# Patient Record
Sex: FEMALE | NOT HISPANIC OR LATINO | Employment: FULL TIME | URBAN - METROPOLITAN AREA
[De-identification: names, ages, dates, MRNs, and addresses within clinical notes are randomized per-mention and may not be internally consistent; named-entity substitution may affect disease eponyms.]

---

## 2024-02-26 ENCOUNTER — OFFICE VISIT (OUTPATIENT)
Dept: PRIMARY CARE | Facility: CLINIC | Age: 54
End: 2024-02-26
Payer: COMMERCIAL

## 2024-02-26 VITALS
OXYGEN SATURATION: 99 % | BODY MASS INDEX: 30.49 KG/M2 | WEIGHT: 183 LBS | TEMPERATURE: 98.9 F | SYSTOLIC BLOOD PRESSURE: 110 MMHG | HEART RATE: 75 BPM | HEIGHT: 65 IN | DIASTOLIC BLOOD PRESSURE: 78 MMHG | RESPIRATION RATE: 20 BRPM

## 2024-02-26 DIAGNOSIS — Z12.31 ENCOUNTER FOR SCREENING MAMMOGRAM FOR MALIGNANT NEOPLASM OF BREAST: ICD-10-CM

## 2024-02-26 DIAGNOSIS — Z12.11 SCREENING FOR COLON CANCER: ICD-10-CM

## 2024-02-26 DIAGNOSIS — Z00.00 ROUTINE ADULT HEALTH MAINTENANCE: Primary | ICD-10-CM

## 2024-02-26 DIAGNOSIS — E55.9 VITAMIN D DEFICIENCY: ICD-10-CM

## 2024-02-26 DIAGNOSIS — E78.2 MIXED HYPERLIPIDEMIA: ICD-10-CM

## 2024-02-26 PROCEDURE — 84443 ASSAY THYROID STIM HORMONE: CPT | Performed by: NURSE PRACTITIONER

## 2024-02-26 PROCEDURE — 85025 COMPLETE CBC W/AUTO DIFF WBC: CPT | Performed by: NURSE PRACTITIONER

## 2024-02-26 PROCEDURE — 80061 LIPID PANEL: CPT | Performed by: NURSE PRACTITIONER

## 2024-02-26 PROCEDURE — 99396 PREV VISIT EST AGE 40-64: CPT | Performed by: NURSE PRACTITIONER

## 2024-02-26 PROCEDURE — 80053 COMPREHEN METABOLIC PANEL: CPT | Performed by: NURSE PRACTITIONER

## 2024-02-26 PROCEDURE — 36415 COLL VENOUS BLD VENIPUNCTURE: CPT | Performed by: NURSE PRACTITIONER

## 2024-02-26 PROCEDURE — 1036F TOBACCO NON-USER: CPT | Performed by: NURSE PRACTITIONER

## 2024-02-26 PROCEDURE — 82306 VITAMIN D 25 HYDROXY: CPT | Performed by: NURSE PRACTITIONER

## 2024-02-26 ASSESSMENT — PAIN SCALES - GENERAL: PAINLEVEL: 0-NO PAIN

## 2024-02-26 ASSESSMENT — PATIENT HEALTH QUESTIONNAIRE - PHQ9
1. LITTLE INTEREST OR PLEASURE IN DOING THINGS: NOT AT ALL
2. FEELING DOWN, DEPRESSED OR HOPELESS: NOT AT ALL
SUM OF ALL RESPONSES TO PHQ9 QUESTIONS 1 AND 2: 0

## 2024-02-26 ASSESSMENT — ENCOUNTER SYMPTOMS
MUSCULOSKELETAL NEGATIVE: 1
CARDIOVASCULAR NEGATIVE: 1
NEUROLOGICAL NEGATIVE: 1
RESPIRATORY NEGATIVE: 1
GASTROINTESTINAL NEGATIVE: 1
CONSTITUTIONAL NEGATIVE: 1

## 2024-02-26 NOTE — PROGRESS NOTES
"Subjective   Patient ID: Shannan Baeza is a 53 y.o. female who presents for Annual Exam (HERE FOR A PHYSICAL AND FASTING BLOOD WORK).    HPI     Review of Systems   Constitutional: Negative.    HENT: Negative.     Respiratory: Negative.     Cardiovascular: Negative.    Gastrointestinal: Negative.    Genitourinary: Negative.    Musculoskeletal: Negative.    Neurological: Negative.        Objective   /78 (BP Location: Left arm, Patient Position: Sitting, BP Cuff Size: Adult)   Pulse 75   Temp 37.2 °C (98.9 °F) (Skin)   Resp 20   Ht 1.651 m (5' 5\")   Wt 83 kg (183 lb)   SpO2 99%   BMI 30.45 kg/m²     Physical Exam  Vitals and nursing note reviewed.   Constitutional:       General: She is not in acute distress.  HENT:      Right Ear: Tympanic membrane and ear canal normal.      Left Ear: Tympanic membrane and ear canal normal.      Nose: Nose normal. No rhinorrhea.      Mouth/Throat:      Pharynx: Oropharynx is clear. No oropharyngeal exudate or posterior oropharyngeal erythema.      Comments: Dentition wnl  Eyes:      Extraocular Movements: Extraocular movements intact.      Conjunctiva/sclera: Conjunctivae normal.      Pupils: Pupils are equal, round, and reactive to light.   Neck:      Vascular: No carotid bruit.   Cardiovascular:      Rate and Rhythm: Normal rate and regular rhythm.      Heart sounds: Normal heart sounds. No murmur heard.  Pulmonary:      Breath sounds: Normal breath sounds. No wheezing or rhonchi.   Abdominal:      General: Bowel sounds are normal. There is no distension.      Palpations: Abdomen is soft. There is no mass.      Tenderness: There is no abdominal tenderness. There is no guarding or rebound.      Hernia: No hernia is present.   Musculoskeletal:         General: No swelling or tenderness. Normal range of motion.      Cervical back: Normal range of motion and neck supple.   Lymphadenopathy:      Cervical: No cervical adenopathy.   Skin:     General: Skin is warm.      " Findings: No rash.   Neurological:      General: No focal deficit present.      Mental Status: She is alert.         Assessment/Plan   Problem List Items Addressed This Visit    None  Visit Diagnoses         Codes    Routine adult health maintenance    -  Primary Z00.00    Relevant Orders    CBC and Auto Differential    Comprehensive Metabolic Panel    Lipid Panel    TSH with reflex to Free T4 if abnormal    Vitamin D 25-Hydroxy,Total (for eval of Vitamin D levels)    Cologuard® colon cancer screening    Encounter for screening mammogram for malignant neoplasm of breast     Z12.31    Relevant Orders    BI mammo bilateral screening tomosynthesis    Screening for colon cancer     Z12.11    Relevant Orders    Cologuard® colon cancer screening

## 2024-02-27 LAB
25(OH)D3 SERPL-MCNC: 28 NG/ML (ref 31–100)
ALBUMIN SERPL-MCNC: 4.2 G/DL (ref 3.5–5)
ALP BLD-CCNC: 83 U/L (ref 35–125)
ALT SERPL-CCNC: 18 U/L (ref 5–40)
ANION GAP SERPL CALC-SCNC: 12 MMOL/L
AST SERPL-CCNC: 15 U/L (ref 5–40)
BASOPHILS # BLD AUTO: 0.06 X10*3/UL (ref 0–0.1)
BASOPHILS NFR BLD AUTO: 0.9 %
BILIRUB SERPL-MCNC: 0.7 MG/DL (ref 0.1–1.2)
BUN SERPL-MCNC: 14 MG/DL (ref 8–25)
CALCIUM SERPL-MCNC: 9.5 MG/DL (ref 8.5–10.4)
CHLORIDE SERPL-SCNC: 104 MMOL/L (ref 97–107)
CHOLEST SERPL-MCNC: 246 MG/DL (ref 133–200)
CHOLEST/HDLC SERPL: 4 {RATIO}
CO2 SERPL-SCNC: 25 MMOL/L (ref 24–31)
CREAT SERPL-MCNC: 0.7 MG/DL (ref 0.4–1.6)
EGFRCR SERPLBLD CKD-EPI 2021: >90 ML/MIN/1.73M*2
EOSINOPHIL # BLD AUTO: 0.15 X10*3/UL (ref 0–0.7)
EOSINOPHIL NFR BLD AUTO: 2.2 %
ERYTHROCYTE [DISTWIDTH] IN BLOOD BY AUTOMATED COUNT: 12.3 % (ref 11.5–14.5)
GLUCOSE SERPL-MCNC: 103 MG/DL (ref 65–99)
HCT VFR BLD AUTO: 41.9 % (ref 36–46)
HDLC SERPL-MCNC: 61 MG/DL
HGB BLD-MCNC: 13.1 G/DL (ref 12–16)
IMM GRANULOCYTES # BLD AUTO: 0.03 X10*3/UL (ref 0–0.7)
IMM GRANULOCYTES NFR BLD AUTO: 0.4 % (ref 0–0.9)
LDLC SERPL CALC-MCNC: 145 MG/DL (ref 65–130)
LYMPHOCYTES # BLD AUTO: 2.05 X10*3/UL (ref 1.2–4.8)
LYMPHOCYTES NFR BLD AUTO: 30.4 %
MCH RBC QN AUTO: 30.8 PG (ref 26–34)
MCHC RBC AUTO-ENTMCNC: 31.3 G/DL (ref 32–36)
MCV RBC AUTO: 98 FL (ref 80–100)
MONOCYTES # BLD AUTO: 0.45 X10*3/UL (ref 0.1–1)
MONOCYTES NFR BLD AUTO: 6.7 %
NEUTROPHILS # BLD AUTO: 4.01 X10*3/UL (ref 1.2–7.7)
NEUTROPHILS NFR BLD AUTO: 59.4 %
NRBC BLD-RTO: 0 /100 WBCS (ref 0–0)
PLATELET # BLD AUTO: 409 X10*3/UL (ref 150–450)
POTASSIUM SERPL-SCNC: 4.4 MMOL/L (ref 3.4–5.1)
PROT SERPL-MCNC: 7.3 G/DL (ref 5.9–7.9)
RBC # BLD AUTO: 4.26 X10*6/UL (ref 4–5.2)
SODIUM SERPL-SCNC: 141 MMOL/L (ref 133–145)
TRIGL SERPL-MCNC: 199 MG/DL (ref 40–150)
TSH SERPL DL<=0.05 MIU/L-ACNC: 1.42 MIU/L (ref 0.27–4.2)
WBC # BLD AUTO: 6.8 X10*3/UL (ref 4.4–11.3)

## 2024-02-28 RX ORDER — ERGOCALCIFEROL 1.25 MG/1
50000 CAPSULE ORAL
Qty: 12 CAPSULE | Refills: 0 | Status: SHIPPED | OUTPATIENT
Start: 2024-02-28 | End: 2024-04-12

## 2024-02-28 RX ORDER — ROSUVASTATIN CALCIUM 5 MG/1
5 TABLET, COATED ORAL DAILY
Qty: 30 TABLET | Refills: 5 | Status: SHIPPED | OUTPATIENT
Start: 2024-02-28 | End: 2024-04-16 | Stop reason: SDUPTHER

## 2024-03-13 LAB — NONINV COLON CA DNA+OCC BLD SCRN STL QL: NEGATIVE

## 2024-04-12 DIAGNOSIS — E55.9 VITAMIN D DEFICIENCY: ICD-10-CM

## 2024-04-12 RX ORDER — ERGOCALCIFEROL 1.25 MG/1
50000 CAPSULE ORAL
Qty: 12 CAPSULE | Refills: 0 | Status: SHIPPED | OUTPATIENT
Start: 2024-04-14 | End: 2024-05-10 | Stop reason: SDUPTHER

## 2024-04-16 ENCOUNTER — OFFICE VISIT (OUTPATIENT)
Dept: PRIMARY CARE | Facility: CLINIC | Age: 54
End: 2024-04-16
Payer: COMMERCIAL

## 2024-04-16 VITALS
OXYGEN SATURATION: 100 % | SYSTOLIC BLOOD PRESSURE: 118 MMHG | DIASTOLIC BLOOD PRESSURE: 68 MMHG | WEIGHT: 184 LBS | HEART RATE: 78 BPM | BODY MASS INDEX: 30.62 KG/M2 | RESPIRATION RATE: 20 BRPM

## 2024-04-16 DIAGNOSIS — E78.2 MIXED HYPERLIPIDEMIA: Primary | ICD-10-CM

## 2024-04-16 DIAGNOSIS — G47.09 OTHER INSOMNIA: ICD-10-CM

## 2024-04-16 PROCEDURE — 99213 OFFICE O/P EST LOW 20 MIN: CPT | Performed by: NURSE PRACTITIONER

## 2024-04-16 PROCEDURE — 1036F TOBACCO NON-USER: CPT | Performed by: NURSE PRACTITIONER

## 2024-04-16 RX ORDER — ROSUVASTATIN CALCIUM 5 MG/1
5 TABLET, COATED ORAL DAILY
Qty: 30 TABLET | Refills: 2 | Status: SHIPPED | OUTPATIENT
Start: 2024-04-16 | End: 2024-10-13

## 2024-04-16 RX ORDER — HYDROXYZINE HYDROCHLORIDE 25 MG/1
25 TABLET, FILM COATED ORAL NIGHTLY
Qty: 30 TABLET | Refills: 1 | Status: SHIPPED | OUTPATIENT
Start: 2024-04-16 | End: 2024-05-10 | Stop reason: SDUPTHER

## 2024-04-16 ASSESSMENT — PATIENT HEALTH QUESTIONNAIRE - PHQ9
1. LITTLE INTEREST OR PLEASURE IN DOING THINGS: NOT AT ALL
SUM OF ALL RESPONSES TO PHQ9 QUESTIONS 1 AND 2: 0
2. FEELING DOWN, DEPRESSED OR HOPELESS: NOT AT ALL

## 2024-04-16 ASSESSMENT — ENCOUNTER SYMPTOMS
OCCASIONAL FEELINGS OF UNSTEADINESS: 0
LOSS OF SENSATION IN FEET: 0
DEPRESSION: 0

## 2024-04-16 ASSESSMENT — PAIN SCALES - GENERAL: PAINLEVEL: 0-NO PAIN

## 2024-04-16 NOTE — PROGRESS NOTES
Subjective   Patient ID: Shannan Baeza is a 53 y.o. female who presents for Panic Attack (Patient states that she has been having on going panic attack issues and changed some meds and has been having better results.).  Issues with acid  and feeling chest pressure and started acid reducer and is improving had EGD in the past. Feels like   HPI     Review of Systems    Objective   /68   Pulse 78   Resp 20   Wt 83.5 kg (184 lb)   LMP  (LMP Unknown)   SpO2 100%   BMI 30.62 kg/m²     Physical Exam  Constitutional:       General: She is not in acute distress.     Appearance: Normal appearance.   Cardiovascular:      Rate and Rhythm: Normal rate and regular rhythm.      Heart sounds: No murmur heard.  Pulmonary:      Breath sounds: Normal breath sounds. No wheezing.   Skin:     General: Skin is warm.   Neurological:      General: No focal deficit present.      Mental Status: She is alert.         Assessment/Plan   Problem List Items Addressed This Visit    None  Visit Diagnoses         Codes    Mixed hyperlipidemia    -  Primary E78.2    Relevant Medications    rosuvastatin (Crestor) 5 mg tablet    Other insomnia     G47.09    Relevant Medications    hydrOXYzine HCL (Atarax) 25 mg tablet

## 2024-05-10 DIAGNOSIS — G47.09 OTHER INSOMNIA: ICD-10-CM

## 2024-05-10 DIAGNOSIS — E55.9 VITAMIN D DEFICIENCY: ICD-10-CM

## 2024-05-10 RX ORDER — ERGOCALCIFEROL 1.25 MG/1
50000 CAPSULE ORAL
Qty: 12 CAPSULE | Refills: 0 | Status: SHIPPED | OUTPATIENT
Start: 2024-05-12

## 2024-05-10 RX ORDER — HYDROXYZINE HYDROCHLORIDE 25 MG/1
25 TABLET, FILM COATED ORAL NIGHTLY
Qty: 90 TABLET | Refills: 0 | Status: SHIPPED | OUTPATIENT
Start: 2024-05-10

## 2024-07-08 DIAGNOSIS — E55.9 VITAMIN D DEFICIENCY: ICD-10-CM

## 2024-07-08 RX ORDER — ERGOCALCIFEROL 1.25 MG/1
50000 CAPSULE ORAL
Qty: 12 CAPSULE | Refills: 0 | Status: SHIPPED | OUTPATIENT
Start: 2024-07-08

## 2024-07-10 DIAGNOSIS — E78.2 MIXED HYPERLIPIDEMIA: ICD-10-CM

## 2024-07-10 RX ORDER — ROSUVASTATIN CALCIUM 5 MG/1
5 TABLET, COATED ORAL DAILY
Qty: 30 TABLET | Refills: 6 | Status: SHIPPED | OUTPATIENT
Start: 2024-07-10 | End: 2025-01-06

## 2024-08-12 DIAGNOSIS — G47.09 OTHER INSOMNIA: ICD-10-CM

## 2024-08-12 RX ORDER — HYDROXYZINE HYDROCHLORIDE 25 MG/1
25 TABLET, FILM COATED ORAL NIGHTLY
Qty: 90 TABLET | Refills: 0 | Status: SHIPPED | OUTPATIENT
Start: 2024-08-12

## 2024-09-05 ENCOUNTER — PATIENT MESSAGE (OUTPATIENT)
Dept: PRIMARY CARE | Facility: CLINIC | Age: 54
End: 2024-09-05
Payer: COMMERCIAL

## 2024-09-05 DIAGNOSIS — E55.9 VITAMIN D DEFICIENCY: ICD-10-CM

## 2024-09-06 RX ORDER — ERGOCALCIFEROL 1.25 MG/1
50000 CAPSULE ORAL
Qty: 12 CAPSULE | Refills: 0 | Status: SHIPPED | OUTPATIENT
Start: 2024-09-08

## 2024-10-21 ENCOUNTER — OFFICE VISIT (OUTPATIENT)
Dept: PRIMARY CARE | Facility: CLINIC | Age: 54
End: 2024-10-21
Payer: COMMERCIAL

## 2024-10-21 ENCOUNTER — DOCUMENTATION (OUTPATIENT)
Dept: PRIMARY CARE | Facility: CLINIC | Age: 54
End: 2024-10-21
Payer: COMMERCIAL

## 2024-10-21 VITALS
SYSTOLIC BLOOD PRESSURE: 112 MMHG | HEART RATE: 74 BPM | DIASTOLIC BLOOD PRESSURE: 74 MMHG | RESPIRATION RATE: 17 BRPM | OXYGEN SATURATION: 98 %

## 2024-10-21 DIAGNOSIS — R73.09 ELEVATED GLUCOSE: ICD-10-CM

## 2024-10-21 DIAGNOSIS — E55.9 VITAMIN D DEFICIENCY: ICD-10-CM

## 2024-10-21 DIAGNOSIS — R42 DIZZINESS: Primary | ICD-10-CM

## 2024-10-21 LAB
ALBUMIN SERPL BCP-MCNC: 4.2 G/DL (ref 3.4–5)
ALP SERPL-CCNC: 72 U/L (ref 33–110)
ALT SERPL W P-5'-P-CCNC: 19 U/L (ref 7–45)
ANION GAP SERPL CALCULATED.3IONS-SCNC: 11 MMOL/L (ref 10–20)
AST SERPL W P-5'-P-CCNC: 15 U/L (ref 9–39)
BASOPHILS # BLD AUTO: 0.05 X10*3/UL (ref 0–0.1)
BASOPHILS NFR BLD AUTO: 0.7 %
BILIRUB SERPL-MCNC: 0.4 MG/DL (ref 0–1.2)
BUN SERPL-MCNC: 14 MG/DL (ref 6–23)
CALCIUM SERPL-MCNC: 9.7 MG/DL (ref 8.6–10.3)
CHLORIDE SERPL-SCNC: 104 MMOL/L (ref 98–107)
CO2 SERPL-SCNC: 28 MMOL/L (ref 21–32)
CREAT SERPL-MCNC: 0.74 MG/DL (ref 0.5–1.05)
EGFRCR SERPLBLD CKD-EPI 2021: >90 ML/MIN/1.73M*2
EOSINOPHIL # BLD AUTO: 0.12 X10*3/UL (ref 0–0.7)
EOSINOPHIL NFR BLD AUTO: 1.6 %
ERYTHROCYTE [DISTWIDTH] IN BLOOD BY AUTOMATED COUNT: 12.3 % (ref 11.5–14.5)
GLUCOSE SERPL-MCNC: 120 MG/DL (ref 74–99)
HCT VFR BLD AUTO: 40 % (ref 36–46)
HGB BLD-MCNC: 12.8 G/DL (ref 12–16)
IMM GRANULOCYTES # BLD AUTO: 0.03 X10*3/UL (ref 0–0.7)
IMM GRANULOCYTES NFR BLD AUTO: 0.4 % (ref 0–0.9)
LYMPHOCYTES # BLD AUTO: 2.32 X10*3/UL (ref 1.2–4.8)
LYMPHOCYTES NFR BLD AUTO: 30.7 %
MCH RBC QN AUTO: 30.8 PG (ref 26–34)
MCHC RBC AUTO-ENTMCNC: 32 G/DL (ref 32–36)
MCV RBC AUTO: 96 FL (ref 80–100)
MONOCYTES # BLD AUTO: 0.42 X10*3/UL (ref 0.1–1)
MONOCYTES NFR BLD AUTO: 5.6 %
NEUTROPHILS # BLD AUTO: 4.62 X10*3/UL (ref 1.2–7.7)
NEUTROPHILS NFR BLD AUTO: 61 %
NRBC BLD-RTO: 0 /100 WBCS (ref 0–0)
PLATELET # BLD AUTO: 422 X10*3/UL (ref 150–450)
POTASSIUM SERPL-SCNC: 4.2 MMOL/L (ref 3.5–5.3)
PROT SERPL-MCNC: 7.4 G/DL (ref 6.4–8.2)
RBC # BLD AUTO: 4.16 X10*6/UL (ref 4–5.2)
SODIUM SERPL-SCNC: 139 MMOL/L (ref 136–145)
TSH SERPL-ACNC: 1.39 MIU/L (ref 0.44–3.98)
WBC # BLD AUTO: 7.6 X10*3/UL (ref 4.4–11.3)

## 2024-10-21 PROCEDURE — 99213 OFFICE O/P EST LOW 20 MIN: CPT | Performed by: NURSE PRACTITIONER

## 2024-10-21 PROCEDURE — 83036 HEMOGLOBIN GLYCOSYLATED A1C: CPT | Mod: WESLAB | Performed by: NURSE PRACTITIONER

## 2024-10-21 PROCEDURE — 84443 ASSAY THYROID STIM HORMONE: CPT | Performed by: NURSE PRACTITIONER

## 2024-10-21 PROCEDURE — 93000 ELECTROCARDIOGRAM COMPLETE: CPT | Performed by: NURSE PRACTITIONER

## 2024-10-21 PROCEDURE — 80053 COMPREHEN METABOLIC PANEL: CPT | Performed by: NURSE PRACTITIONER

## 2024-10-21 PROCEDURE — 85025 COMPLETE CBC W/AUTO DIFF WBC: CPT | Performed by: NURSE PRACTITIONER

## 2024-10-21 PROCEDURE — 36415 COLL VENOUS BLD VENIPUNCTURE: CPT | Performed by: NURSE PRACTITIONER

## 2024-10-21 ASSESSMENT — PATIENT HEALTH QUESTIONNAIRE - PHQ9
SUM OF ALL RESPONSES TO PHQ9 QUESTIONS 1 AND 2: 0
2. FEELING DOWN, DEPRESSED OR HOPELESS: NOT AT ALL
1. LITTLE INTEREST OR PLEASURE IN DOING THINGS: NOT AT ALL

## 2024-10-21 ASSESSMENT — ENCOUNTER SYMPTOMS
OCCASIONAL FEELINGS OF UNSTEADINESS: 0
DEPRESSION: 0
LOSS OF SENSATION IN FEET: 0

## 2024-10-21 ASSESSMENT — PAIN SCALES - GENERAL: PAINLEVEL_OUTOF10: 0-NO PAIN

## 2024-10-21 NOTE — PROGRESS NOTES
Subjective   Patient ID: Shannan Baeza is a 53 y.o. female who presents for Dizziness (Patient here for dizzy, chills and not feeling well).    HPI Concern that she too high or low sugar or sodium, seemed to improve yesterday, then this am started chromium supplement one month ago, no urinary concern, increased fluids was drinking apple cider , seems slight better than yesterday,     Review of Systems   Constitutional:  Positive for appetite change, diaphoresis and fatigue.   Eyes:  Positive for visual disturbance.   Neurological:  Positive for dizziness.       Objective   /74 (BP Location: Left arm, Patient Position: Sitting, BP Cuff Size: Adult)   Pulse 74   Resp 17   SpO2 98%     Physical Exam  Constitutional:       General: She is not in acute distress.     Appearance: Normal appearance.   Cardiovascular:      Rate and Rhythm: Normal rate and regular rhythm.      Heart sounds: No murmur heard.  Pulmonary:      Breath sounds: Normal breath sounds. No wheezing.   Musculoskeletal:         General: Normal range of motion.   Skin:     General: Skin is warm.   Neurological:      General: No focal deficit present.      Mental Status: She is alert.         Assessment/Plan   Problem List Items Addressed This Visit    None  Visit Diagnoses         Codes    Dizziness    -  Primary R42    Relevant Orders    CBC and Auto Differential (Completed)    Comprehensive metabolic panel (Completed)    ECG 12 lead (Clinic Performed)    Vitamin D deficiency     E55.9    Relevant Orders    TSH with reflex to Free T4 if abnormal (Completed)    Elevated glucose     R73.09

## 2024-10-22 ENCOUNTER — APPOINTMENT (OUTPATIENT)
Dept: PRIMARY CARE | Facility: CLINIC | Age: 54
End: 2024-10-22
Payer: COMMERCIAL

## 2024-10-22 DIAGNOSIS — R73.09 ELEVATED GLUCOSE: Primary | ICD-10-CM

## 2024-10-22 LAB
EST. AVERAGE GLUCOSE BLD GHB EST-MCNC: 111 MG/DL
HBA1C MFR BLD: 5.5 %

## 2024-10-22 ASSESSMENT — ENCOUNTER SYMPTOMS
FATIGUE: 1
DIZZINESS: 1
APPETITE CHANGE: 1
DIAPHORESIS: 1

## 2024-12-03 ENCOUNTER — PATIENT MESSAGE (OUTPATIENT)
Dept: PRIMARY CARE | Facility: CLINIC | Age: 54
End: 2024-12-03
Payer: COMMERCIAL

## 2024-12-03 DIAGNOSIS — E55.9 VITAMIN D DEFICIENCY: ICD-10-CM

## 2024-12-06 RX ORDER — ERGOCALCIFEROL 1.25 MG/1
50000 CAPSULE ORAL
Qty: 12 CAPSULE | Refills: 0 | Status: SHIPPED | OUTPATIENT
Start: 2024-12-08

## 2024-12-23 ENCOUNTER — PATIENT MESSAGE (OUTPATIENT)
Dept: PRIMARY CARE | Facility: CLINIC | Age: 54
End: 2024-12-23
Payer: COMMERCIAL

## 2024-12-23 DIAGNOSIS — G47.09 OTHER INSOMNIA: ICD-10-CM

## 2024-12-23 RX ORDER — HYDROXYZINE HYDROCHLORIDE 25 MG/1
25 TABLET, FILM COATED ORAL NIGHTLY
Qty: 90 TABLET | Refills: 1 | Status: SHIPPED | OUTPATIENT
Start: 2024-12-23

## 2025-01-11 DIAGNOSIS — E78.2 MIXED HYPERLIPIDEMIA: ICD-10-CM

## 2025-01-13 RX ORDER — ROSUVASTATIN CALCIUM 5 MG/1
5 TABLET, COATED ORAL DAILY
Qty: 90 TABLET | Refills: 2 | Status: SHIPPED | OUTPATIENT
Start: 2025-01-13

## 2025-01-28 ENCOUNTER — OFFICE VISIT (OUTPATIENT)
Dept: OBSTETRICS AND GYNECOLOGY | Facility: CLINIC | Age: 55
End: 2025-01-28
Payer: COMMERCIAL

## 2025-01-28 VITALS
SYSTOLIC BLOOD PRESSURE: 137 MMHG | HEIGHT: 65 IN | WEIGHT: 187 LBS | BODY MASS INDEX: 31.16 KG/M2 | DIASTOLIC BLOOD PRESSURE: 79 MMHG

## 2025-01-28 DIAGNOSIS — Z12.31 ENCOUNTER FOR SCREENING MAMMOGRAM FOR MALIGNANT NEOPLASM OF BREAST: Primary | ICD-10-CM

## 2025-01-28 DIAGNOSIS — Z01.419 WOMEN'S ANNUAL ROUTINE GYNECOLOGICAL EXAMINATION: Primary | ICD-10-CM

## 2025-01-28 PROBLEM — Z90.710 HISTORY OF HYSTERECTOMY: Status: ACTIVE | Noted: 2019-05-30

## 2025-01-28 PROCEDURE — 99396 PREV VISIT EST AGE 40-64: CPT | Performed by: OBSTETRICS & GYNECOLOGY

## 2025-01-28 PROCEDURE — 3008F BODY MASS INDEX DOCD: CPT | Performed by: OBSTETRICS & GYNECOLOGY

## 2025-01-28 PROCEDURE — 1036F TOBACCO NON-USER: CPT | Performed by: OBSTETRICS & GYNECOLOGY

## 2025-01-28 RX ORDER — ELECTROLYTES/DEXTROSE
5 SOLUTION, ORAL ORAL DAILY
COMMUNITY

## 2025-01-28 SDOH — ECONOMIC STABILITY: FOOD INSECURITY: WITHIN THE PAST 12 MONTHS, THE FOOD YOU BOUGHT JUST DIDN'T LAST AND YOU DIDN'T HAVE MONEY TO GET MORE.: NEVER TRUE

## 2025-01-28 SDOH — ECONOMIC STABILITY: FOOD INSECURITY: WITHIN THE PAST 12 MONTHS, YOU WORRIED THAT YOUR FOOD WOULD RUN OUT BEFORE YOU GOT MONEY TO BUY MORE.: NEVER TRUE

## 2025-01-28 SDOH — ECONOMIC STABILITY: TRANSPORTATION INSECURITY
IN THE PAST 12 MONTHS, HAS THE LACK OF TRANSPORTATION KEPT YOU FROM MEDICAL APPOINTMENTS OR FROM GETTING MEDICATIONS?: NO

## 2025-01-28 SDOH — ECONOMIC STABILITY: TRANSPORTATION INSECURITY
IN THE PAST 12 MONTHS, HAS LACK OF TRANSPORTATION KEPT YOU FROM MEETINGS, WORK, OR FROM GETTING THINGS NEEDED FOR DAILY LIVING?: NO

## 2025-01-28 ASSESSMENT — ENCOUNTER SYMPTOMS
DEPRESSION: 0
LOSS OF SENSATION IN FEET: 0
OCCASIONAL FEELINGS OF UNSTEADINESS: 0

## 2025-01-28 ASSESSMENT — PATIENT HEALTH QUESTIONNAIRE - PHQ9
SUM OF ALL RESPONSES TO PHQ9 QUESTIONS 1 & 2: 0
1. LITTLE INTEREST OR PLEASURE IN DOING THINGS: NOT AT ALL
2. FEELING DOWN, DEPRESSED OR HOPELESS: NOT AT ALL

## 2025-01-28 ASSESSMENT — PAIN SCALES - GENERAL: PAINLEVEL_OUTOF10: 0-NO PAIN

## 2025-01-28 NOTE — PROGRESS NOTES
"Shannan Baeza is a 54 y.o. female who is here for a routine exam. PCP = Padmini Sawyer, APRN-CNP  Here for annual exam no complaints    Review of Systems  Negative review of systems    Physical Exam  Constitutional:       Appearance: Normal appearance. She is obese.   Genitourinary:      Genitourinary Comments: External genitalia unremarkable  Vagina clear cuff intact  Cervix and uterus surgically absent  Adnexa masses or tenderness  Perineum without lesions or swelling    Breast without masses tenderness or nipple discharge, normal appearance   Breasts:     Breasts are soft.     Right: Normal.      Left: Normal.   HENT:      Head: Normocephalic.      Nose: Nose normal.   Eyes:      Pupils: Pupils are equal, round, and reactive to light.   Cardiovascular:      Rate and Rhythm: Normal rate and regular rhythm.   Pulmonary:      Effort: Pulmonary effort is normal.      Breath sounds: Normal breath sounds.   Abdominal:      General: Abdomen is flat. Bowel sounds are normal.      Palpations: Abdomen is soft.   Musculoskeletal:         General: Normal range of motion.      Cervical back: Normal range of motion and neck supple.   Neurological:      General: No focal deficit present.      Mental Status: She is alert.   Skin:     General: Skin is warm and dry.   Psychiatric:         Mood and Affect: Mood normal.         Behavior: Behavior normal.         Thought Content: Thought content normal.         Judgment: Judgment normal.   Vitals reviewed.     Objective   /79   Ht 1.651 m (5' 5\")   Wt 84.8 kg (187 lb)   LMP 2019 (Exact Date) Comment: Pt had Hysterectomy  BMI 31.12 kg/m²   OB History          2    Para   2    Term   2            AB        Living   2         SAB        IAB        Ectopic        Multiple        Live Births   2                  GynHx:  Menarche/Menopause: 15/hysterectomy    Social History     Substance and Sexual Activity   Sexual Activity Yes    Partners: Male "     STIs: none    Substance:   Tobacco Use: Low Risk  (1/28/2025)    Patient History     Smoking Tobacco Use: Never     Smokeless Tobacco Use: Never     Passive Exposure: Never      Social History     Substance and Sexual Activity   Drug Use Never      Social History     Substance and Sexual Activity   Alcohol Use Never     Abuse: No  Depression Screen:   Negative    Past med hx and past surg hx reviewed and notable for: Post hysterectomy    Assessment/Plan      Unremarkable annual GYN exam.  Patient is sexually active with steady partner.  Declining STD testing.  Mammogram order placed.  Discussed diet exercise calcium and vitamin D.  Patient may return to office in 1 year or as needed

## 2025-02-04 ENCOUNTER — HOSPITAL ENCOUNTER (OUTPATIENT)
Dept: RADIOLOGY | Facility: HOSPITAL | Age: 55
Discharge: HOME | End: 2025-02-04
Payer: COMMERCIAL

## 2025-02-04 DIAGNOSIS — Z12.31 ENCOUNTER FOR SCREENING MAMMOGRAM FOR MALIGNANT NEOPLASM OF BREAST: ICD-10-CM

## 2025-02-04 PROCEDURE — 77067 SCR MAMMO BI INCL CAD: CPT

## 2025-02-04 PROCEDURE — 77067 SCR MAMMO BI INCL CAD: CPT | Performed by: RADIOLOGY

## 2025-02-04 PROCEDURE — 77063 BREAST TOMOSYNTHESIS BI: CPT | Performed by: RADIOLOGY

## 2025-03-10 ENCOUNTER — TELEPHONE (OUTPATIENT)
Dept: PRIMARY CARE | Facility: CLINIC | Age: 55
End: 2025-03-10

## 2025-03-10 ENCOUNTER — APPOINTMENT (OUTPATIENT)
Dept: PRIMARY CARE | Facility: CLINIC | Age: 55
End: 2025-03-10
Payer: COMMERCIAL

## 2025-03-10 VITALS
HEART RATE: 64 BPM | WEIGHT: 184 LBS | BODY MASS INDEX: 30.66 KG/M2 | HEIGHT: 65 IN | TEMPERATURE: 98 F | OXYGEN SATURATION: 98 % | SYSTOLIC BLOOD PRESSURE: 124 MMHG | DIASTOLIC BLOOD PRESSURE: 68 MMHG

## 2025-03-10 DIAGNOSIS — E55.9 VITAMIN D DEFICIENCY: ICD-10-CM

## 2025-03-10 DIAGNOSIS — Z00.00 ROUTINE GENERAL MEDICAL EXAMINATION AT A HEALTH CARE FACILITY: ICD-10-CM

## 2025-03-10 DIAGNOSIS — E55.9 VITAMIN D DEFICIENCY: Primary | ICD-10-CM

## 2025-03-10 DIAGNOSIS — E78.2 MIXED HYPERLIPIDEMIA: ICD-10-CM

## 2025-03-10 PROBLEM — M54.30 SCIATICA: Status: ACTIVE | Noted: 2025-03-10

## 2025-03-10 PROBLEM — R42 DIZZINESS AND GIDDINESS: Status: ACTIVE | Noted: 2025-03-10

## 2025-03-10 PROBLEM — N94.6 DYSMENORRHEA: Status: RESOLVED | Noted: 2025-03-10 | Resolved: 2025-03-10

## 2025-03-10 PROBLEM — N94.6 DYSMENORRHEA: Status: ACTIVE | Noted: 2025-03-10

## 2025-03-10 PROCEDURE — 3008F BODY MASS INDEX DOCD: CPT | Performed by: STUDENT IN AN ORGANIZED HEALTH CARE EDUCATION/TRAINING PROGRAM

## 2025-03-10 PROCEDURE — 1036F TOBACCO NON-USER: CPT | Performed by: STUDENT IN AN ORGANIZED HEALTH CARE EDUCATION/TRAINING PROGRAM

## 2025-03-10 PROCEDURE — 99396 PREV VISIT EST AGE 40-64: CPT | Performed by: STUDENT IN AN ORGANIZED HEALTH CARE EDUCATION/TRAINING PROGRAM

## 2025-03-10 RX ORDER — BISMUTH SUBSALICYLATE 262 MG
1 TABLET,CHEWABLE ORAL DAILY
COMMUNITY

## 2025-03-10 ASSESSMENT — ENCOUNTER SYMPTOMS
DYSURIA: 0
MYALGIAS: 0
LIGHT-HEADEDNESS: 0
FATIGUE: 0
NAUSEA: 0
DIZZINESS: 0
SINUS PRESSURE: 0
SLEEP DISTURBANCE: 0
WHEEZING: 0
RHINORRHEA: 0
CONSTIPATION: 0
VOMITING: 0
ARTHRALGIAS: 0
PALPITATIONS: 0
DIARRHEA: 0
FEVER: 0
DYSPHORIC MOOD: 0
SINUS PAIN: 0
HEADACHES: 0
COUGH: 0
NERVOUS/ANXIOUS: 0
CHILLS: 0
WOUND: 0
SHORTNESS OF BREATH: 0
POLYDIPSIA: 0
FREQUENCY: 0

## 2025-03-10 ASSESSMENT — PATIENT HEALTH QUESTIONNAIRE - PHQ9
2. FEELING DOWN, DEPRESSED OR HOPELESS: NOT AT ALL
1. LITTLE INTEREST OR PLEASURE IN DOING THINGS: NOT AT ALL
SUM OF ALL RESPONSES TO PHQ9 QUESTIONS 1 AND 2: 0

## 2025-03-10 ASSESSMENT — PAIN SCALES - GENERAL: PAINLEVEL_OUTOF10: 0-NO PAIN

## 2025-03-10 NOTE — PROGRESS NOTES
Shannan Baeza is a 54 y.o. female who is presenting for New Patient Visit and Annual Exam (Mammogram 2025)     New patient here for annual exam.      Last  Visit: 1 year  Reported Health: Good    Dental, Vision, Hearing:  Regular dental visits: no  - Brushes teeth 2 times per day  - Flosses? yes  Vision problems: no  - Wears glasses or contacts? yes - readers  - Last eye exam:  scheduled  Hearing loss: no    Immunization status:  Up to date: no    Lifestyle:  Healthy diet: yes  Regular exercise: yes  Alcohol: yes  Tobacco: no  Drugs: no    Reproductive Health:  Menopausal: yes  - LMP:  2019  - hysterectomy  Menopause symptoms: no  Bladder concerns: no    Pregnancy History:   : 2  Parity: 2    Cervical Cancer Screening:  Last pap: n/a  History of abnormal pap smear? no  Breast Cancer Screening:  Last mammogram:  2024  Colorectal Cancer Screening:  Last colonoscopy or Cologuard: 2024  Metabolic Screening:  Lipid profile: ordered  Glucose screen: ordered    Review of Systems   Constitutional:  Negative for chills, fatigue and fever.   HENT:  Negative for congestion, hearing loss, rhinorrhea, sinus pressure, sinus pain and tinnitus.    Eyes:  Negative for visual disturbance.   Respiratory:  Negative for cough, shortness of breath and wheezing.    Cardiovascular:  Negative for chest pain, palpitations and leg swelling.   Gastrointestinal:  Negative for constipation, diarrhea, nausea and vomiting.   Endocrine: Negative for cold intolerance, heat intolerance, polydipsia and polyuria.   Genitourinary:  Negative for dysuria, frequency, menstrual problem, urgency and vaginal discharge.   Musculoskeletal:  Negative for arthralgias and myalgias.   Skin:  Negative for pallor, rash and wound.   Neurological:  Negative for dizziness, light-headedness and headaches.   Psychiatric/Behavioral:  Negative for dysphoric mood and sleep disturbance. The patient is not nervous/anxious.         Previous History  Past  "Medical History:   Diagnosis Date    Hyperlipidemia     Other specified health status 11/03/2014    Known health problems: none    Personal history of other diseases of the digestive system     History of gastroesophageal reflux (GERD)     Past Surgical History:   Procedure Laterality Date    HYSTERECTOMY  May 2019    OTHER SURGICAL HISTORY  05/21/2019    Hysterectomy robotic    TONSILLECTOMY  11/03/2014    Tonsillectomy With Adenoidectomy    TUBAL LIGATION  11/03/2014    Tubal Ligation     Social History     Tobacco Use    Smoking status: Never     Passive exposure: Never    Smokeless tobacco: Never   Vaping Use    Vaping status: Never Used   Substance Use Topics    Alcohol use: Yes     Alcohol/week: 2.0 standard drinks of alcohol     Types: 2 Standard drinks or equivalent per week    Drug use: Never     Family History   Problem Relation Name Age of Onset    Diabetes Mother      Hyperlipidemia Mother      Hypertension Mother      Diabetes Father      Hyperlipidemia Father      Hypertension Father       Allergies   Allergen Reactions    Penicillins Swelling    Sulfa (Sulfonamide Antibiotics) Hives     Current Outpatient Medications   Medication Instructions    biotin 5 mg, Daily    ergocalciferol (VITAMIN D-2) 50,000 Units, oral, Once Weekly    hydrOXYzine HCL (ATARAX) 25 mg, oral, Nightly    iron/calcium/vitamin D2 (CALCIUM 600 IRON/D ORAL) 1 capsule, Daily    multivitamin tablet 1 tablet, Daily    rosuvastatin (CRESTOR) 5 mg, oral, Daily       Visit Vitals  /68   Pulse 64   Temp 36.7 °C (98 °F)   Ht 1.651 m (5' 5\")   Wt 83.5 kg (184 lb)   LMP 05/08/2019 (Exact Date) Comment: Pt had Hysterectomy   SpO2 98%   BMI 30.62 kg/m²   OB Status Hysterectomy   Smoking Status Never   BSA 1.96 m²         Physical Exam  Constitutional:       Appearance: Normal appearance.   HENT:      Head: Normocephalic and atraumatic.      Right Ear: Tympanic membrane, ear canal and external ear normal.      Left Ear: Tympanic membrane, " ear canal and external ear normal.      Nose: Nose normal.      Mouth/Throat:      Mouth: Mucous membranes are moist.      Pharynx: Oropharynx is clear.   Eyes:      Extraocular Movements: Extraocular movements intact.      Conjunctiva/sclera: Conjunctivae normal.      Pupils: Pupils are equal, round, and reactive to light.   Cardiovascular:      Rate and Rhythm: Normal rate and regular rhythm.      Pulses: Normal pulses.      Heart sounds: Normal heart sounds.   Pulmonary:      Effort: Pulmonary effort is normal.      Breath sounds: Normal breath sounds.   Abdominal:      General: There is no distension.      Palpations: Abdomen is soft.      Tenderness: There is no abdominal tenderness.   Musculoskeletal:         General: Normal range of motion.      Cervical back: Normal range of motion.   Lymphadenopathy:      Cervical: No cervical adenopathy.   Skin:     General: Skin is warm and dry.   Neurological:      Mental Status: She is alert and oriented to person, place, and time. Mental status is at baseline.   Psychiatric:         Mood and Affect: Mood normal.         Behavior: Behavior normal.           Assessment & Plan  Vitamin D deficiency    Orders:    Vitamin D 25-Hydroxy,Total (for eval of Vitamin D levels); Future  was treated with prescription strength vitamin D x3 months.  Will amina levels.    Mixed hyperlipidemia    Orders:    Lipid panel; Future    Comprehensive metabolic panel; Future  update labs to ensure current dose remains appropriate.   Routine general medical examination at a health care facility       cancer screenings reviewed and up to date.   Immunizations reviewed.  Due for Shingrix, declined today.  Will review at next OV.   Reviewed basics of diet and exercise - patient meeting weekly exercise recommendations - doing short workouts every day.       Reviewed and approved by ABDIRAHMAN GREENFIELD on 3/10/25 at 12:34 PM.

## 2025-03-11 ENCOUNTER — APPOINTMENT (OUTPATIENT)
Dept: PRIMARY CARE | Facility: CLINIC | Age: 55
End: 2025-03-11
Payer: COMMERCIAL

## 2025-03-11 ENCOUNTER — PATIENT MESSAGE (OUTPATIENT)
Dept: PRIMARY CARE | Facility: CLINIC | Age: 55
End: 2025-03-11

## 2025-03-11 DIAGNOSIS — M25.461 PAIN AND SWELLING OF RIGHT KNEE: Primary | ICD-10-CM

## 2025-03-11 DIAGNOSIS — M25.561 PAIN AND SWELLING OF RIGHT KNEE: Primary | ICD-10-CM

## 2025-03-11 LAB
ALBUMIN SERPL-MCNC: 4.2 G/DL (ref 3.6–5.1)
ALP SERPL-CCNC: 73 U/L (ref 37–153)
ALT SERPL-CCNC: 13 U/L (ref 6–29)
ANION GAP SERPL CALCULATED.4IONS-SCNC: 9 MMOL/L (CALC) (ref 7–17)
APPEARANCE UR: CLEAR
AST SERPL-CCNC: 13 U/L (ref 10–35)
BASOPHILS # BLD AUTO: 39 CELLS/UL (ref 0–200)
BASOPHILS NFR BLD AUTO: 0.5 %
BILIRUB SERPL-MCNC: 0.4 MG/DL (ref 0.2–1.2)
BILIRUB UR QL STRIP: NEGATIVE
BUN SERPL-MCNC: 13 MG/DL (ref 7–25)
CALCIUM SERPL-MCNC: 9.3 MG/DL (ref 8.6–10.4)
CHLORIDE SERPL-SCNC: 105 MMOL/L (ref 98–110)
CHOLEST SERPL-MCNC: 176 MG/DL
CHOLEST/HDLC SERPL: 2.6 (CALC)
CO2 SERPL-SCNC: 26 MMOL/L (ref 20–32)
COLOR UR: YELLOW
CREAT SERPL-MCNC: 0.66 MG/DL (ref 0.5–1.03)
EGFRCR SERPLBLD CKD-EPI 2021: 105 ML/MIN/1.73M2
EOSINOPHIL # BLD AUTO: 172 CELLS/UL (ref 15–500)
EOSINOPHIL NFR BLD AUTO: 2.2 %
ERYTHROCYTE [DISTWIDTH] IN BLOOD BY AUTOMATED COUNT: 11.7 % (ref 11–15)
GLUCOSE SERPL-MCNC: 101 MG/DL (ref 65–99)
GLUCOSE UR QL STRIP: NEGATIVE
HCT VFR BLD AUTO: 39.5 % (ref 35–45)
HDLC SERPL-MCNC: 69 MG/DL
HGB BLD-MCNC: 13.1 G/DL (ref 11.7–15.5)
HGB UR QL STRIP: NEGATIVE
KETONES UR QL STRIP: NEGATIVE
LDLC SERPL CALC-MCNC: 81 MG/DL (CALC)
LEUKOCYTE ESTERASE UR QL STRIP: NEGATIVE
LYMPHOCYTES # BLD AUTO: 2332 CELLS/UL (ref 850–3900)
LYMPHOCYTES NFR BLD AUTO: 29.9 %
MCH RBC QN AUTO: 30.8 PG (ref 27–33)
MCHC RBC AUTO-ENTMCNC: 33.2 G/DL (ref 32–36)
MCV RBC AUTO: 92.7 FL (ref 80–100)
MONOCYTES # BLD AUTO: 460 CELLS/UL (ref 200–950)
MONOCYTES NFR BLD AUTO: 5.9 %
NEUTROPHILS # BLD AUTO: 4797 CELLS/UL (ref 1500–7800)
NEUTROPHILS NFR BLD AUTO: 61.5 %
NITRITE UR QL STRIP: NEGATIVE
NONHDLC SERPL-MCNC: 107 MG/DL (CALC)
PH UR STRIP: 8 [PH] (ref 5–8)
PLATELET # BLD AUTO: 380 THOUSAND/UL (ref 140–400)
PMV BLD REES-ECKER: 8.4 FL (ref 7.5–12.5)
POTASSIUM SERPL-SCNC: 4.5 MMOL/L (ref 3.5–5.3)
PROT SERPL-MCNC: 7.4 G/DL (ref 6.1–8.1)
PROT UR QL STRIP: NEGATIVE
RBC # BLD AUTO: 4.26 MILLION/UL (ref 3.8–5.1)
SODIUM SERPL-SCNC: 140 MMOL/L (ref 135–146)
SP GR UR STRIP: 1.02 (ref 1–1.03)
TRIGL SERPL-MCNC: 164 MG/DL
WBC # BLD AUTO: 7.8 THOUSAND/UL (ref 3.8–10.8)

## 2025-03-12 LAB — 25(OH)D3+25(OH)D2 SERPL-MCNC: 47 NG/ML (ref 30–100)

## 2025-03-26 DIAGNOSIS — G47.09 OTHER INSOMNIA: ICD-10-CM

## 2025-03-26 RX ORDER — HYDROXYZINE HYDROCHLORIDE 25 MG/1
25 TABLET, FILM COATED ORAL NIGHTLY
Qty: 90 TABLET | Refills: 0 | Status: SHIPPED | OUTPATIENT
Start: 2025-03-26

## 2025-03-27 ENCOUNTER — OFFICE VISIT (OUTPATIENT)
Dept: PRIMARY CARE | Facility: CLINIC | Age: 55
End: 2025-03-27
Payer: COMMERCIAL

## 2025-03-27 ENCOUNTER — PATIENT MESSAGE (OUTPATIENT)
Dept: PRIMARY CARE | Facility: CLINIC | Age: 55
End: 2025-03-27

## 2025-03-27 VITALS
SYSTOLIC BLOOD PRESSURE: 126 MMHG | HEART RATE: 89 BPM | TEMPERATURE: 98.1 F | OXYGEN SATURATION: 98 % | DIASTOLIC BLOOD PRESSURE: 74 MMHG

## 2025-03-27 DIAGNOSIS — H65.02 NON-RECURRENT ACUTE SEROUS OTITIS MEDIA OF LEFT EAR: Primary | ICD-10-CM

## 2025-03-27 DIAGNOSIS — H65.02 NON-RECURRENT ACUTE SEROUS OTITIS MEDIA OF LEFT EAR: ICD-10-CM

## 2025-03-27 PROCEDURE — 1036F TOBACCO NON-USER: CPT | Performed by: STUDENT IN AN ORGANIZED HEALTH CARE EDUCATION/TRAINING PROGRAM

## 2025-03-27 PROCEDURE — 99213 OFFICE O/P EST LOW 20 MIN: CPT | Performed by: STUDENT IN AN ORGANIZED HEALTH CARE EDUCATION/TRAINING PROGRAM

## 2025-03-27 RX ORDER — METHYLPREDNISOLONE 4 MG/1
TABLET ORAL
Qty: 21 TABLET | Refills: 0 | Status: SHIPPED | OUTPATIENT
Start: 2025-03-27 | End: 2025-04-02

## 2025-03-27 RX ORDER — AZELASTINE 1 MG/ML
1 SPRAY, METERED NASAL 2 TIMES DAILY
Qty: 30 ML | Refills: 1 | Status: SHIPPED | OUTPATIENT
Start: 2025-03-27 | End: 2026-03-27

## 2025-03-27 RX ORDER — FLUTICASONE PROPIONATE 50 MCG
1 SPRAY, SUSPENSION (ML) NASAL DAILY
Qty: 16 G | Refills: 1 | Status: SHIPPED | OUTPATIENT
Start: 2025-03-27 | End: 2026-03-27

## 2025-03-27 ASSESSMENT — ENCOUNTER SYMPTOMS
LIGHT-HEADEDNESS: 0
WHEEZING: 0
NAUSEA: 0
DYSPHORIC MOOD: 0
CONSTIPATION: 0
RHINORRHEA: 0
SORE THROAT: 1
WOUND: 0
FREQUENCY: 0
SINUS PAIN: 0
DIARRHEA: 0
SLEEP DISTURBANCE: 0
DYSURIA: 0
VOMITING: 0
CHILLS: 0
FEVER: 0
NERVOUS/ANXIOUS: 0
SINUS PRESSURE: 0
POLYDIPSIA: 0
MYALGIAS: 0
DIZZINESS: 0
FATIGUE: 0
ARTHRALGIAS: 0
PALPITATIONS: 0
COUGH: 1
SHORTNESS OF BREATH: 0
HEADACHES: 0

## 2025-03-27 ASSESSMENT — PAIN SCALES - GENERAL: PAINLEVEL_OUTOF10: 0-NO PAIN

## 2025-03-27 NOTE — PROGRESS NOTES
Subjective   Patient ID: Shannan Baeza is a 54 y.o. female who presents for Sick Visit (C/o sore throat, left ear ache x 4 days).    Symptoms started 3/24/25.  Continuing to worsen.  Having sore throat.  Unable to hear from left ear.  No fevers or chills.       at home was sick, but only Tuesday-Wednesday.        Review of Systems   Constitutional:  Negative for chills, fatigue and fever.   HENT:  Positive for ear pain and sore throat. Negative for congestion, ear discharge, hearing loss, rhinorrhea, sinus pressure, sinus pain and tinnitus.    Eyes:  Negative for visual disturbance.   Respiratory:  Positive for cough. Negative for shortness of breath and wheezing.    Cardiovascular:  Negative for chest pain, palpitations and leg swelling.   Gastrointestinal:  Negative for constipation, diarrhea, nausea and vomiting.   Endocrine: Negative for cold intolerance, heat intolerance, polydipsia and polyuria.   Genitourinary:  Negative for dysuria, frequency, menstrual problem, urgency and vaginal discharge.   Musculoskeletal:  Negative for arthralgias and myalgias.   Skin:  Negative for pallor, rash and wound.   Neurological:  Negative for dizziness, light-headedness and headaches.   Psychiatric/Behavioral:  Negative for dysphoric mood and sleep disturbance. The patient is not nervous/anxious.        Objective     Visit Vitals  /74   Pulse 89   Temp 36.7 °C (98.1 °F)   LMP 05/08/2019 (Exact Date) Comment: Pt had Hysterectomy   SpO2 98%   OB Status Hysterectomy   Smoking Status Never         Physical Exam  Constitutional:       Appearance: Normal appearance.   HENT:      Head: Normocephalic and atraumatic.      Right Ear: Tympanic membrane, ear canal and external ear normal.      Left Ear: Ear canal and external ear normal. A middle ear effusion is present.      Nose: Nose normal.      Mouth/Throat:      Mouth: Mucous membranes are moist.      Pharynx: Oropharynx is clear.   Eyes:      Conjunctiva/sclera:  Conjunctivae normal.   Cardiovascular:      Rate and Rhythm: Normal rate and regular rhythm.      Pulses: Normal pulses.      Heart sounds: Normal heart sounds.   Pulmonary:      Effort: Pulmonary effort is normal.      Breath sounds: Normal breath sounds.   Musculoskeletal:         General: Normal range of motion.      Cervical back: Normal range of motion.   Lymphadenopathy:      Cervical: No cervical adenopathy.   Skin:     General: Skin is warm and dry.   Neurological:      Mental Status: She is alert and oriented to person, place, and time. Mental status is at baseline.   Psychiatric:         Mood and Affect: Mood normal.         Behavior: Behavior normal.         Assessment & Plan  Non-recurrent acute serous otitis media of left ear    Orders:    olopatadine-mometasone 665-25 mcg/spray spray,non-aerosol; Administer 1 spray into affected nostril(s) 2 times a day.    methylPREDNISolone (Medrol Dospak) 4 mg tablets; Follow schedule on package instructions  Discussed that this is most commonly a viral etiology.  Would recommend treatments to try to help with the ear drain rather than starting antibiotics at this time.  Prescription sent for olopatadine mometasone nasal spray as well as Medrol Dosepak to pharmacy.  If patient continues to worsen day by day, she is to call the office back on Monday and I will send an antibiotic for otitis media.    Discussed possibility of testing for COVID-19.  Patient reports that she does not work around people, so was able to avoid interacting with other people pretty much entirely until she would be out of isolation.  We discussed that there would be possibility of treating with Paxlovid, however benefit is marginal and patient declined testing after discussion.  Considered influenza as a possible differential however this would be somewhat unusual given lack of fever and myalgias.     Reviewed and approved by ABDIRAHMAN GREENFIELD on 3/27/25 at 3:05 PM.

## 2025-03-31 DIAGNOSIS — E55.9 VITAMIN D DEFICIENCY: ICD-10-CM

## 2025-03-31 RX ORDER — AZITHROMYCIN 250 MG/1
TABLET, FILM COATED ORAL
Qty: 6 TABLET | Refills: 0 | Status: SHIPPED | OUTPATIENT
Start: 2025-03-31 | End: 2025-04-05

## 2025-04-18 DIAGNOSIS — H65.02 NON-RECURRENT ACUTE SEROUS OTITIS MEDIA OF LEFT EAR: ICD-10-CM

## 2025-04-18 RX ORDER — OLOPATADINE HYDROCHLORIDE AND MOMETASONE FUROATE 25; 665 UG/1; UG/1
SPRAY, METERED NASAL
Qty: 87 G | Refills: 1 | Status: SHIPPED | OUTPATIENT
Start: 2025-04-18

## 2025-05-04 RX ORDER — MOMETASONE FUROATE MONOHYDRATE 50 UG/1
1 SPRAY, METERED NASAL 2 TIMES DAILY
Qty: 17 G | Refills: 11 | Status: SHIPPED | OUTPATIENT
Start: 2025-05-04 | End: 2026-05-04

## 2025-05-04 RX ORDER — OLOPATADINE HYDROCHLORIDE 665 UG/1
2 SPRAY NASAL 2 TIMES DAILY
Qty: 30.5 G | Refills: 1 | Status: SHIPPED | OUTPATIENT
Start: 2025-05-04

## 2025-05-07 ENCOUNTER — OFFICE VISIT (OUTPATIENT)
Dept: URGENT CARE | Age: 55
End: 2025-05-07
Payer: COMMERCIAL

## 2025-05-07 VITALS
RESPIRATION RATE: 20 BRPM | BODY MASS INDEX: 30.79 KG/M2 | TEMPERATURE: 98.2 F | WEIGHT: 185 LBS | HEART RATE: 67 BPM | OXYGEN SATURATION: 98 % | SYSTOLIC BLOOD PRESSURE: 122 MMHG | DIASTOLIC BLOOD PRESSURE: 84 MMHG

## 2025-05-07 DIAGNOSIS — B37.0 ORAL THRUSH: Primary | ICD-10-CM

## 2025-05-07 RX ORDER — NYSTATIN 100000 [USP'U]/ML
5 SUSPENSION ORAL 4 TIMES DAILY
Qty: 200 ML | Refills: 0 | Status: SHIPPED | OUTPATIENT
Start: 2025-05-07 | End: 2025-05-17

## 2025-05-07 NOTE — PATIENT INSTRUCTIONS
Try nystatin mouthwash use 5 mL orally swish and spit 4 times a day.  As directed follow-up with primary care doctor in 1 to 2 days for recheck and go to the emergency department any worsening symptoms May need to follow-up with dentist as well.

## 2025-05-07 NOTE — PROGRESS NOTES
Subjective   Patient ID: Shannan Baeza is a 54 y.o. female. They present today with a chief complaint of Allergic Reaction (Pt states she feels mouth is on fire for a few weeks, believes is a allergic reaction ).    History of Present Illness  HPI    Past Medical History  Allergies as of 05/07/2025 - Reviewed 05/07/2025   Allergen Reaction Noted    Penicillins Swelling 10/23/2016    Sulfa (sulfonamide antibiotics) Hives 10/23/2016       Prescriptions Prior to Admission[1]     Medical History[2]    Surgical History[3]     reports that she has never smoked. She has never been exposed to tobacco smoke. She has never used smokeless tobacco. She reports current alcohol use of about 2.0 standard drinks of alcohol per week. She reports that she does not use drugs.    Review of Systems  Review of Systems   HENT:          Mild tongue burning   All other systems reviewed and are negative.                                 Objective    Vitals:    05/07/25 1640   BP: 122/84   BP Location: Left arm   Patient Position: Sitting   BP Cuff Size: Large adult   Pulse: 67   Resp: 20   Temp: 36.8 °C (98.2 °F)   TempSrc: Oral   SpO2: 98%   Weight: 83.9 kg (185 lb)     Patient's last menstrual period was 05/08/2019 (exact date).    Physical Exam  Vitals reviewed.   Constitutional:       Appearance: Normal appearance.   HENT:      Head: Normocephalic and atraumatic.      Right Ear: Tympanic membrane and ear canal normal.      Left Ear: Tympanic membrane, ear canal and external ear normal.      Nose: Nose normal.      Mouth/Throat:      Mouth: Mucous membranes are moist.      Comments: mild white film on tongue  Eyes:      Extraocular Movements: Extraocular movements intact.      Conjunctiva/sclera: Conjunctivae normal.      Pupils: Pupils are equal, round, and reactive to light.   Cardiovascular:      Rate and Rhythm: Normal rate and regular rhythm.      Pulses: Normal pulses.      Heart sounds: Normal heart sounds.   Pulmonary:       Effort: Pulmonary effort is normal.      Breath sounds: Normal breath sounds.   Musculoskeletal:         General: Normal range of motion.      Cervical back: Normal range of motion.   Skin:     General: Skin is warm.      Capillary Refill: Capillary refill takes less than 2 seconds.   Neurological:      General: No focal deficit present.      Mental Status: She is alert and oriented to person, place, and time.   Psychiatric:         Mood and Affect: Mood normal.         Behavior: Behavior normal.         Procedures    Point of Care Test & Imaging Results from this visit  No results found for this visit on 05/07/25.   Imaging  No results found.    Cardiology, Vascular, and Other Imaging  No other imaging results found for the past 2 days      Diagnostic study results (if any) were reviewed by [facility].    Assessment/Plan   Allergies, medications, history, and pertinent labs/EKGs/Imaging reviewed by BLANE Ponce-CNP.     Medical Decision Making  54-year-old female presents for a burning in her tongue.  She reports has been going on a few weeks.  Has seen her primary care doctor and has had not much relief.  On exam patient is no acute distress vital signs are stable heart rate is regular lungs are clear bilaterally throat has no erythema patent airway there are no mouth sores tongue has white film on it.  Patent airway no cervical lymphadenopathy lungs are clear bilaterally patient does not have any difficulty swallowing there is no rash pulse ox is 98% patient reports she went to the dentist and was no help.  No dental caries.  Patient is to start nystatin as directed for thrush follow-up with primary care doctor 1 to 2 days if symptoms worsen in any way go to the emergency department patient agrees with plan of care patient stable condition.    Orders and Diagnoses  There are no diagnoses linked to this encounter.    Medical Admin Record      Patient disposition:  Home    Electronically signed by [facility]  4:43 PM           [1] (Not in a hospital admission)  [2]   Past Medical History:  Diagnosis Date    Hyperlipidemia     Other specified health status 11/03/2014    Known health problems: none    Personal history of other diseases of the digestive system     History of gastroesophageal reflux (GERD)   [3]   Past Surgical History:  Procedure Laterality Date    HYSTERECTOMY  May 2019    OTHER SURGICAL HISTORY  05/21/2019    Hysterectomy robotic    TONSILLECTOMY  11/03/2014    Tonsillectomy With Adenoidectomy    TUBAL LIGATION  11/03/2014    Tubal Ligation

## 2025-05-19 ENCOUNTER — PATIENT MESSAGE (OUTPATIENT)
Dept: OBSTETRICS AND GYNECOLOGY | Facility: CLINIC | Age: 55
End: 2025-05-19
Payer: COMMERCIAL

## 2025-07-30 ENCOUNTER — OFFICE VISIT (OUTPATIENT)
Dept: PRIMARY CARE | Facility: CLINIC | Age: 55
End: 2025-07-30
Payer: COMMERCIAL

## 2025-07-30 VITALS
HEIGHT: 65 IN | BODY MASS INDEX: 30.66 KG/M2 | HEART RATE: 71 BPM | WEIGHT: 184 LBS | OXYGEN SATURATION: 98 % | DIASTOLIC BLOOD PRESSURE: 70 MMHG | SYSTOLIC BLOOD PRESSURE: 110 MMHG

## 2025-07-30 DIAGNOSIS — R09.A2 GLOBUS SENSATION: ICD-10-CM

## 2025-07-30 DIAGNOSIS — B37.0 THRUSH, ORAL: Primary | ICD-10-CM

## 2025-07-30 DIAGNOSIS — B99.9 RECURRENT INFECTIONS: ICD-10-CM

## 2025-07-30 PROCEDURE — 99214 OFFICE O/P EST MOD 30 MIN: CPT | Performed by: STUDENT IN AN ORGANIZED HEALTH CARE EDUCATION/TRAINING PROGRAM

## 2025-07-30 PROCEDURE — 3008F BODY MASS INDEX DOCD: CPT | Performed by: STUDENT IN AN ORGANIZED HEALTH CARE EDUCATION/TRAINING PROGRAM

## 2025-07-30 PROCEDURE — 1036F TOBACCO NON-USER: CPT | Performed by: STUDENT IN AN ORGANIZED HEALTH CARE EDUCATION/TRAINING PROGRAM

## 2025-07-30 RX ORDER — FLUCONAZOLE 100 MG/1
TABLET ORAL
Qty: 9 TABLET | Refills: 0 | Status: SHIPPED | OUTPATIENT
Start: 2025-07-30 | End: 2025-08-07

## 2025-07-30 ASSESSMENT — PATIENT HEALTH QUESTIONNAIRE - PHQ9
SUM OF ALL RESPONSES TO PHQ9 QUESTIONS 1 AND 2: 0
1. LITTLE INTEREST OR PLEASURE IN DOING THINGS: NOT AT ALL
2. FEELING DOWN, DEPRESSED OR HOPELESS: NOT AT ALL

## 2025-07-30 ASSESSMENT — COLUMBIA-SUICIDE SEVERITY RATING SCALE - C-SSRS: 1. IN THE PAST MONTH, HAVE YOU WISHED YOU WERE DEAD OR WISHED YOU COULD GO TO SLEEP AND NOT WAKE UP?: NO

## 2025-07-30 ASSESSMENT — ENCOUNTER SYMPTOMS: SORE THROAT: 1

## 2025-07-30 ASSESSMENT — PAIN SCALES - GENERAL: PAINLEVEL_OUTOF10: 0-NO PAIN

## 2025-07-30 NOTE — PROGRESS NOTES
" Subjective   Patient ID: Shannan Baeza is a 54 y.o. female who presents for Thrush.    Shannan presents today for a follow-up on a recurrent oral thrush candidiasis that has flared up the 3rd time last week. The first time it happened in May, she went to  and was prescribed nystatin mouthwash. It has resolved but came back in June, had a telemedicine appointment and was prescribed chlorhexidine. It went away, this is the 3rd time it happened. She describes it as burning sensation in her throat at the base of the tongue. She mentioned that she has acid reflux in the past couple of weeks so she started Omeprazole 2 times a day to help with GERD. That might be correlated to the flare and recurrence of oral thrush. No OCP use, no recent antibiotics she has been taking. We discussed the importance of immunoglobulin screen to check for signs  of immunosuppression and checking A1C to rule out diabetes as a cause of recurrent thrush.          Review of Systems   HENT:  Positive for sore throat.         Oral candidiasis with oral thrush that creates burning sensation in her throat.        Objective   /70   Pulse 71   Ht 1.651 m (5' 5\")   Wt 83.5 kg (184 lb)   LMP 05/08/2019 (Exact Date) Comment: Pt had Hysterectomy  SpO2 98%   BMI 30.62 kg/m²     Physical Exam  HENT:      Mouth/Throat:      Mouth: Oral lesions present.      Comments: White sediment on the base of the tongue.   Neck:      Trachea: No tracheal deviation.      Comments: Mild swelling and fullness in the anterior neck superior to the thyroid  Musculoskeletal:      Cervical back: No crepitus.         Assessment/Plan   Assessment & Plan  Thrush, oral  In the setting of recurrent oral thrush, it would be appropriate to treat patient as though there is a colonization issue and use Diflucan rather than nystatin swish.  Orders:    fluconazole (Diflucan) 100 mg tablet; Take 2 tablets (200 mg) by mouth once daily for 1 day, THEN 1 tablet (100 mg) once " daily for 7 days.    Recurrent infections  Given recurrent infections in a patient with no known immune compromise, we should obtain further workup with immunoglobulins.  Patient is in a long-term monogamous relationship, so HIV was not ordered as part of initial workup.  If no other causes are found, that would be a necessary step.  Orders:    Immunoglobulins, IgG, IgA, IgM; Future    Hemoglobin A1c; Future    CBC and Auto Differential; Future    Comprehensive metabolic panel; Future    Globus sensation    Orders:    US head neck soft tissue; Future             Deep Shen MS3    I, or a resident under my supervision, was present with the medical student who participated in the documentation of this note.  I have personally seen and examined the patient and performed the medical decision-making components. I have reviewed the medical student documentation and/or resident documentation and verified the findings in the note as written with additions or exceptions as stated in the body of the note.    Mateusz Joy DO    Reviewed by Dr. Mateusz Joy DO

## 2025-07-30 NOTE — PROGRESS NOTES
"Subjective   Patient ID: Shannan Baeza is a 54 y.o. female who presents for Thrush.  HPI    Review of Systems    Objective     Visit Vitals  /70   Pulse 71   Ht 1.651 m (5' 5\")   Wt 83.5 kg (184 lb)   LMP 05/08/2019 (Exact Date) Comment: Pt had Hysterectomy   SpO2 98%   BMI 30.62 kg/m²   OB Status Hysterectomy   Smoking Status Never   BSA 1.96 m²         Physical Exam    Assessment & Plan  Thrush, oral    Orders:    fluconazole (Diflucan) 100 mg tablet; Take 2 tablets (200 mg) by mouth once daily for 1 day, THEN 1 tablet (100 mg) once daily for 7 days.    Recurrent infections    Orders:    Immunoglobulins, IgG, IgA, IgM; Future    Hemoglobin A1c; Future    CBC and Auto Differential; Future    Comprehensive metabolic panel; Future    Globus sensation    Orders:    US head neck soft tissue; Future             "

## 2025-07-31 LAB
ALBUMIN SERPL-MCNC: 4.3 G/DL (ref 3.6–5.1)
ALP SERPL-CCNC: 69 U/L (ref 37–153)
ALT SERPL-CCNC: 18 U/L (ref 6–29)
ANION GAP SERPL CALCULATED.4IONS-SCNC: 10 MMOL/L (CALC) (ref 7–17)
AST SERPL-CCNC: 15 U/L (ref 10–35)
BASOPHILS # BLD AUTO: 57 CELLS/UL (ref 0–200)
BASOPHILS NFR BLD AUTO: 0.7 %
BILIRUB SERPL-MCNC: 0.3 MG/DL (ref 0.2–1.2)
BUN SERPL-MCNC: 16 MG/DL (ref 7–25)
CALCIUM SERPL-MCNC: 9.6 MG/DL (ref 8.6–10.4)
CHLORIDE SERPL-SCNC: 103 MMOL/L (ref 98–110)
CO2 SERPL-SCNC: 25 MMOL/L (ref 20–32)
CREAT SERPL-MCNC: 0.75 MG/DL (ref 0.5–1.03)
EGFRCR SERPLBLD CKD-EPI 2021: 96 ML/MIN/1.73M2
EOSINOPHIL # BLD AUTO: 251 CELLS/UL (ref 15–500)
EOSINOPHIL NFR BLD AUTO: 3.1 %
ERYTHROCYTE [DISTWIDTH] IN BLOOD BY AUTOMATED COUNT: 12.2 % (ref 11–15)
EST. AVERAGE GLUCOSE BLD GHB EST-MCNC: 126 MG/DL
EST. AVERAGE GLUCOSE BLD GHB EST-SCNC: 7 MMOL/L
GLUCOSE SERPL-MCNC: 93 MG/DL (ref 65–139)
HBA1C MFR BLD: 6 %
HCT VFR BLD AUTO: 39 % (ref 35–45)
HGB BLD-MCNC: 13 G/DL (ref 11.7–15.5)
IGA SERPL-MCNC: 302 MG/DL (ref 47–310)
IGG SERPL-MCNC: 1507 MG/DL (ref 600–1640)
IGM SERPL-MCNC: 81 MG/DL (ref 50–300)
LYMPHOCYTES # BLD AUTO: 3224 CELLS/UL (ref 850–3900)
LYMPHOCYTES NFR BLD AUTO: 39.8 %
MCH RBC QN AUTO: 31.4 PG (ref 27–33)
MCHC RBC AUTO-ENTMCNC: 33.3 G/DL (ref 32–36)
MCV RBC AUTO: 94.2 FL (ref 80–100)
MONOCYTES # BLD AUTO: 616 CELLS/UL (ref 200–950)
MONOCYTES NFR BLD AUTO: 7.6 %
NEUTROPHILS # BLD AUTO: 3953 CELLS/UL (ref 1500–7800)
NEUTROPHILS NFR BLD AUTO: 48.8 %
PLATELET # BLD AUTO: 441 THOUSAND/UL (ref 140–400)
PMV BLD REES-ECKER: 8.4 FL (ref 7.5–12.5)
POTASSIUM SERPL-SCNC: 4.2 MMOL/L (ref 3.5–5.3)
PROT SERPL-MCNC: 7.6 G/DL (ref 6.1–8.1)
RBC # BLD AUTO: 4.14 MILLION/UL (ref 3.8–5.1)
SODIUM SERPL-SCNC: 138 MMOL/L (ref 135–146)
WBC # BLD AUTO: 8.1 THOUSAND/UL (ref 3.8–10.8)

## 2025-08-04 ENCOUNTER — HOSPITAL ENCOUNTER (OUTPATIENT)
Dept: RADIOLOGY | Facility: HOSPITAL | Age: 55
Discharge: HOME | End: 2025-08-04
Payer: COMMERCIAL

## 2025-08-04 DIAGNOSIS — R09.A2 GLOBUS SENSATION: ICD-10-CM

## 2025-08-04 PROCEDURE — 76536 US EXAM OF HEAD AND NECK: CPT | Performed by: STUDENT IN AN ORGANIZED HEALTH CARE EDUCATION/TRAINING PROGRAM

## 2025-08-04 PROCEDURE — 76536 US EXAM OF HEAD AND NECK: CPT

## 2025-08-06 ENCOUNTER — PATIENT MESSAGE (OUTPATIENT)
Dept: PRIMARY CARE | Facility: CLINIC | Age: 55
End: 2025-08-06
Payer: COMMERCIAL

## 2025-08-06 DIAGNOSIS — B37.0 THRUSH, ORAL: ICD-10-CM

## 2025-08-06 RX ORDER — FLUCONAZOLE 100 MG/1
100 TABLET ORAL DAILY
Qty: 7 TABLET | Refills: 0 | Status: SHIPPED | OUTPATIENT
Start: 2025-08-06 | End: 2025-08-13

## 2026-03-16 ENCOUNTER — APPOINTMENT (OUTPATIENT)
Dept: PRIMARY CARE | Facility: CLINIC | Age: 56
End: 2026-03-16
Payer: COMMERCIAL